# Patient Record
Sex: MALE | Race: WHITE
[De-identification: names, ages, dates, MRNs, and addresses within clinical notes are randomized per-mention and may not be internally consistent; named-entity substitution may affect disease eponyms.]

---

## 2018-02-05 ENCOUNTER — HOSPITAL ENCOUNTER (EMERGENCY)
Dept: HOSPITAL 24 - ER | Age: 23
Discharge: HOME | End: 2018-02-05
Payer: COMMERCIAL

## 2018-02-05 VITALS — SYSTOLIC BLOOD PRESSURE: 141 MMHG | DIASTOLIC BLOOD PRESSURE: 85 MMHG

## 2018-02-05 VITALS — BODY MASS INDEX: 27.2 KG/M2

## 2018-02-05 DIAGNOSIS — M85.60: ICD-10-CM
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
## 2018-02-05 DIAGNOSIS — S43.402A: Primary | ICD-10-CM
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
## 2018-02-05 PROCEDURE — 99282 EMERGENCY DEPT VISIT SF MDM: CPT

## 2018-02-05 PROCEDURE — 96372 THER/PROPH/DIAG INJ SC/IM: CPT

## 2018-02-05 PROCEDURE — 73030 X-RAY EXAM OF SHOULDER: CPT

## 2018-02-05 NOTE — DR.EXTPAIN
HPI





- Time seen


Time seen: 11:00





- PCP


Primary Care Physician: ERMELINDA





- HPI Comment


HPI Comment: PAIN GETTING WORSE. NO OBVIOUS TRAUMA REPORTED. UNABLE TO RAISE 

LEFT HAND UP.





- Complaint/Symptoms


Chief Complaint Doctor Comments: LEFT SHOULDER PAIN TIMES 2 WEEKS.


Chief Complaint:: LEFT SHOULDER PAIN THAT STARTED 2 WEEKS AGO PT DENIES INJURY,,

,, PT C/O THAT PAIN RAIDIATES TO HIS NECK. PT'S PAIN IS SHARP AND THROBBY,,,, 

HOLDING THE ARM DOES EASE PAIN SOME,


Self Treatment fo Chief Complaint: MOTRIN ,





- Nurses notes reviewed


Nurses Notes Review: Yes





- Source


History Provided: Patient





- Mode of arrival


Mode of Arrival: Ambulatory





- Timing


Onset of Chief Complaint: 01/28/18





- Context


History of: None





- Associated signs and symptoms


Associated Signs and Symptoms: Pain





PMH





- PMH


Past Medical History: No


Past Surgical History: Yes


Past Surgical History Comment: LEFT SHOULDER SURGERY,





- Family History


History of Family Medical Conditions: Yes


Family Medical History: Cancer


Family Medical History Comment: THYROID,





- Social History


Does patient currently use any type of tobacco product: No


Have you used tobacco products in the last 12 months: No


Type of Tobacco Use: None


Does any household member use tobacco: No


Alcohol Use: None


Do you use any recreational Drugs:: No


Lives With: Family


Lives Where: Home





- infectious screening


In the last 2 months have you had wt loss of >10#?: YES


Have you had fever, night sweats or hemotysis?: No


Have you traveled outside the country in the last 6 months?: No


Isolation: Standard





ROS





- Review of Systems


Constitutional: No Symptoms Reported


Eyes: No Symptoms Reported


ENTM: No Symptoms Reported


Respiratoy: No Symptoms Reported


Cardiovascular: No Symptoms Reported


Gastrointestinal/Abdominal: No Symptoms Reported


Genitourinary: No Symptoms Reported


Neurological: No Symptoms Reported


Musculoskeletal: Joint Pain, Left, Shoulder


Integumentary: No Symptoms Reported


Hematologic/Lymphatic: No Symptoms Reported


Endocrine: No Symptoms Reported


All Other Systems: Reviewed and Negative





PE





- Vital Signs


Vitals: 


 





Temperature                      97.9 F


Pulse Rate                       76


Respiratory Rate                 18


Blood Pressure                   141/85


O2 Sat by Pulse Oximetry         98











- General


Limitations: No Limitations


General Appearance: Alert





- Head


Head Exam: Normal Inspection





- Eyes


Eye exam: Normal Appearance





- ENT


ENT Exam: Normal  External Ear Exam





- Neck


Neck Exam: Normal Inspection





- Chest


Chest Inspection: Symmetric Chest Wall Rise





- Respiratory


Respiratory Exam: Normal Lung Sounds Bilat


Respiratory Exam: Bilateral Clear to Auscultation





- Cardiovascular


Cardiovascular Exam: Regular Rate, Normal Rhythm, Normal Heart Sounds





- Abdominal Exam


Abdominal Exam: Normal Inspection





- Extremities


Extremities Exam: Tenderness (TENDERNESS LT SHOULDER. ROM DECREASE.)





- Upper Extremities


Shoulder Exam: Tenderness.  negative: Full ROM (DECREASE ROM LT.)





- Lower Extremities


Neurovascular/Tendon Exam: Normal Capillary Refill


Gait Exam: Observed and Normal





- Back


Back Exam: Normal Inspection





- Neurological


Neurological Exam: Alert, Oriented X3





- Psychiatric


Psychiatric Exam: Normal Affect, Normal Mood





- Skin


Skin Exam: Normal Color





MDM





- Differential Diagnosis


Differential Diagnosis: Fracture, Sprain





Course





- Treatment


Treatment: SEE ORDERS.





- Education/Counseling


Education/Counseling: Patient, Education


Educated On: Diagnosis, Needs for Follow Up





ROR





- XRAY


XRAY Interpreted by: Radiologist


XRAY Findings: REPORT DISCUSS WITH PATIENT.





- Diagnosis


Discharge Problem: 


 Bone cyst





Sprain of left shoulder


Qualifiers:


 Encounter type: initial encounter Shoulder sprain type: unspecified sprain 

Qualified Code(s): S43.402A - Unspecified sprain of left shoulder joint, 

initial encounter








- Discharge Plan


Disposition: 01 HOME, SELF-CARE


Condition: Stable


Prescriptions: 


Cyclobenzaprine HCl [FLEXERIL 10 MG *] 10 mg PO TID #20 tab


Ibuprofen [MOTRIN  MG *] 600 mg PO TID PRN #30 tab


 PRN Reason: Pain/Inflammation





- Follow ups/Referrals


Follow ups/Referrals: 


[facility],None [Primary Care Provider] - 3 days


BRY MASSEY [STAFF PHYSICIAN] - 3 days





- Instructions


Instructions:  Shoulder Sprain


Additional Instructions: 


YOU ALSO HAVE CYST IN THE HUERUS ON THE LEFT SIDE. RETURN TO ED IF WORSE.

## 2018-02-05 NOTE — RAD
Examination: Left shoulder, three views



History: Pain, no known injury



Findings: No fracture or dislocation or pathologic calcification. There is a subcortical area of bony
 lucency measuring 2.0 cm diameter at the level of the greater tuberosity. No pathologic fracture is 
seen.



Impression: Nonspecific benign appearing cystic area in the proximal humerus. See above. No acute com
ponent is identified. Because of the described symptoms, follow-up MRI is suggested.



Reported By:Electronically Signed by JERO BLOUNT MD at 2/5/2018 11:44:12 AM